# Patient Record
Sex: MALE | Race: WHITE | NOT HISPANIC OR LATINO | URBAN - METROPOLITAN AREA
[De-identification: names, ages, dates, MRNs, and addresses within clinical notes are randomized per-mention and may not be internally consistent; named-entity substitution may affect disease eponyms.]

---

## 2017-04-20 ENCOUNTER — IMPORTED ENCOUNTER (OUTPATIENT)
Dept: URBAN - METROPOLITAN AREA CLINIC 31 | Facility: CLINIC | Age: 72
End: 2017-04-20

## 2017-04-20 PROBLEM — Z96.1: Noted: 2017-04-20

## 2017-04-20 PROCEDURE — 92004 COMPRE OPH EXAM NEW PT 1/>: CPT

## 2017-04-20 PROCEDURE — 92015 DETERMINE REFRACTIVE STATE: CPT

## 2017-04-20 NOTE — PATIENT DISCUSSION
1.  Pseudophakia OU - IOLs stable. Clear capsules ou. Monitor. 2. Pt had questions about blue light. On computers alot. 3.  No rx changes. Good about wearing Gabby suns.  4.  RTN 1 yr CE   VSP

## 2018-05-14 ENCOUNTER — IMPORTED ENCOUNTER (OUTPATIENT)
Dept: URBAN - METROPOLITAN AREA CLINIC 31 | Facility: CLINIC | Age: 73
End: 2018-05-14

## 2018-05-14 PROBLEM — Z96.1: Noted: 2018-05-14

## 2018-05-14 PROCEDURE — 92015 DETERMINE REFRACTIVE STATE: CPT

## 2018-05-14 PROCEDURE — 92014 COMPRE OPH EXAM EST PT 1/>: CPT

## 2018-05-14 NOTE — PATIENT DISCUSSION
1.  Pseudophakia OU - IOLs stable. Clear capsules ou. Monitor. 2. Pt had questions about blue light. On computers alot. 3.  No rx changes. Good about wearing Gabby suns. 4.  RTN 1 yr CE     Has VSP for materials.   Gave copy

## 2019-04-23 ENCOUNTER — IMPORTED ENCOUNTER (OUTPATIENT)
Dept: URBAN - METROPOLITAN AREA CLINIC 31 | Facility: CLINIC | Age: 74
End: 2019-04-23

## 2019-04-23 PROBLEM — Z96.1: Noted: 2019-04-23

## 2019-04-23 PROCEDURE — 92014 COMPRE OPH EXAM EST PT 1/>: CPT

## 2019-04-23 PROCEDURE — 92015 DETERMINE REFRACTIVE STATE: CPT

## 2019-04-23 NOTE — PATIENT DISCUSSION
1.  Pseudophakia OU - IOLs stable. Clear capsules ou. Monitor. 2. Pt had questions about blue light. On computers alot. 3.  Slight rx changes. Good about wearing Gabby suns. 4.  RTN 1 yr CE     Has VSP for materials.   Gave copy

## 2019-09-29 ENCOUNTER — HEALTH MAINTENANCE LETTER (OUTPATIENT)
Age: 74
End: 2019-09-29

## 2020-03-15 ENCOUNTER — HEALTH MAINTENANCE LETTER (OUTPATIENT)
Age: 75
End: 2020-03-15

## 2020-11-04 NOTE — PATIENT DISCUSSION
KRYSTAL OU:  PRESCRIBED UV PROTECTION TO SLOW GROWTH. PRESCRIBE ARTIFICAL TEARS TO INCREASE COMFORT.

## 2020-11-11 ENCOUNTER — IMPORTED ENCOUNTER (OUTPATIENT)
Dept: URBAN - METROPOLITAN AREA CLINIC 31 | Facility: CLINIC | Age: 75
End: 2020-11-11

## 2020-11-11 PROBLEM — Z96.1: Noted: 2020-11-11

## 2020-11-11 PROCEDURE — 92015 DETERMINE REFRACTIVE STATE: CPT

## 2020-11-11 PROCEDURE — 92014 COMPRE OPH EXAM EST PT 1/>: CPT

## 2020-11-11 NOTE — PATIENT DISCUSSION
1.  Pseudophakia OU - IOLs stable. Clear capsules ou. Monitor. 2. Got blue leo gls. .  On computers alot. 3.  Slight rx changes. Good about wearing Gabby suns. wants another pr for tennis. 4.  RTN 1 yr CE     Has VSP for materials.   Gave copy

## 2021-01-14 ENCOUNTER — HEALTH MAINTENANCE LETTER (OUTPATIENT)
Age: 76
End: 2021-01-14

## 2021-05-08 ENCOUNTER — HEALTH MAINTENANCE LETTER (OUTPATIENT)
Age: 76
End: 2021-05-08

## 2021-10-23 ENCOUNTER — HEALTH MAINTENANCE LETTER (OUTPATIENT)
Age: 76
End: 2021-10-23

## 2021-11-08 ENCOUNTER — IMPORTED ENCOUNTER (OUTPATIENT)
Dept: URBAN - METROPOLITAN AREA CLINIC 31 | Facility: CLINIC | Age: 76
End: 2021-11-08

## 2021-11-08 PROBLEM — H01.004: Noted: 2021-11-08

## 2021-11-08 PROBLEM — H01.02A: Noted: 2021-11-08

## 2021-11-08 PROBLEM — H01.001: Noted: 2021-11-08

## 2021-11-08 PROBLEM — H01.005: Noted: 2021-11-08

## 2021-11-08 PROBLEM — H01.002: Noted: 2021-11-08

## 2021-11-08 PROBLEM — Z96.1: Noted: 2021-11-08

## 2021-11-08 PROBLEM — H01.02B: Noted: 2021-11-08

## 2021-11-08 PROCEDURE — 99214 OFFICE O/P EST MOD 30 MIN: CPT

## 2021-11-08 PROCEDURE — 92015 DETERMINE REFRACTIVE STATE: CPT

## 2021-11-08 NOTE — PATIENT DISCUSSION
1.  Blepharitis anterior type OU - The patient exhibits reddened crusty eyelid margins. Wash lids and Warm compresses were recommended. Artificial Tears to be used as needed for discomfort. 2. Pseudophakia OU - IOLs stable. Clear capsules ou. Monitor. 3. Got blue lighht gls. .  On computers alot. 4.  Slight rx changes. Good about wearing Gabby suns. wants another pr for tennis. 5.  RTN 1 yr CE     Has VSP for materials.   Gave copy

## 2022-04-02 ASSESSMENT — TONOMETRY
OD_IOP_MMHG: 14
OD_IOP_MMHG: 14
OD_IOP_MMHG: 15
OS_IOP_MMHG: 15
OS_IOP_MMHG: 14
OD_IOP_MMHG: 15
OS_IOP_MMHG: 15
OS_IOP_MMHG: 14
OS_IOP_MMHG: 15
OD_IOP_MMHG: 14

## 2022-04-02 ASSESSMENT — VISUAL ACUITY
OS_CC: 20/25
OD_CC: 20/25
OS_SC: 20/100
OD_SC: 20/100

## 2022-06-04 ENCOUNTER — HEALTH MAINTENANCE LETTER (OUTPATIENT)
Age: 77
End: 2022-06-04

## 2022-09-06 ENCOUNTER — VIRTUAL VISIT (OUTPATIENT)
Dept: FAMILY MEDICINE | Facility: CLINIC | Age: 77
End: 2022-09-06
Payer: MEDICARE

## 2022-09-06 DIAGNOSIS — U07.1 INFECTION DUE TO 2019 NOVEL CORONAVIRUS: Primary | ICD-10-CM

## 2022-09-06 PROCEDURE — 99442 PR PHYSICIAN TELEPHONE EVALUATION 11-20 MIN: CPT | Mod: CS | Performed by: FAMILY MEDICINE

## 2022-09-06 NOTE — PROGRESS NOTES
Alhaji is a 76 year old who is being evaluated via a billable telephone visit.      What phone number would you like to be contacted at? [phone]  How would you like to obtain your AVS? MyChart    Infection due to 2019 novel coronavirus  COVID-19 infection noted.  Onset of symptoms September 3, 2022 with positive diagnosis Sunday, September 4, 2022 with home testing.  Wife had been sick beginning last Tuesday, August 30 with positive test September 2, 2022 herself.  Did review patient's past history.  Did have slightly reduced renal function in 2014.  Lab assessment reviewed from [place] primary care provider without obvious renal function update.  Patient elects reduced Paxlovid dosing based on prior renal function results with GFR 53 and declines lab only visit for kidney function completion.  CDC recommendations regarding quarantine and symptomatic management reviewed.  - nirmatrelvir and ritonavir (PAXLOVID) therapy pack  Dispense: 30 each; Refill: 0       Subjective   Alhaji is a 76 year old, presenting for the following health issues:  tele - [phone] and Covid Concern (Positive home test - 9/5/22, symptoms started 9/4/22 - chest congestion, coughing, runny nose, general achy, lack of energy - pt has been vaccinated - oxygen levels 93-96%)      HPI     Virtual visit completed.  States that onset of symptoms of COVID September 3, 2022 with diagnosis September 4, 2022 with home test.  Wife was sick last week and tested positive on Friday, September 2, 2022 herself.  Patient had sore chest with cough.  Some head congestion.  Sore throat for about a day and this is now better.  Feels marginally better this morning.  Had been somewhat feverish and chilled previously in the evening however this is also resolved at this time.  Denies history of asthma.  Comprehensive review of systems as above otherwise all negative.      Review of Systems   Constitutional, HEENT, cardiovascular, pulmonary, GI, ,  musculoskeletal, neuro, skin, endocrine and psych systems are negative, except as otherwise noted.      Objective    Vitals - Patient Reported  Systolic (Patient Reported): 123  Diastolic (Patient Reported): 77  Pulse (Patient Reported): 77      Vitals:  No vitals were obtained today due to virtual visit.    Physical Exam   healthy, alert and no distress  PSYCH: Alert and oriented times 3; coherent speech, normal   rate and volume, able to articulate logical thoughts, able   to abstract reason, no tangential thoughts, no hallucinations   or delusions  His affect is normal  RESP: No cough, no audible wheezing, able to talk in full sentences  Remainder of exam unable to be completed due to telephone visits                Phone call duration: 15 minutes    [email or web address]  @[email or web address]@

## 2022-10-10 ENCOUNTER — HEALTH MAINTENANCE LETTER (OUTPATIENT)
Age: 77
End: 2022-10-10

## 2022-11-07 ENCOUNTER — COMPREHENSIVE EXAM (OUTPATIENT)
Dept: URBAN - METROPOLITAN AREA CLINIC 34 | Facility: CLINIC | Age: 77
End: 2022-11-07

## 2022-11-07 DIAGNOSIS — H01.02A: ICD-10-CM

## 2022-11-07 DIAGNOSIS — H01.02B: ICD-10-CM

## 2022-11-07 DIAGNOSIS — Z96.1: ICD-10-CM

## 2022-11-07 PROCEDURE — 92014 COMPRE OPH EXAM EST PT 1/>: CPT

## 2022-11-07 PROCEDURE — 92015 DETERMINE REFRACTIVE STATE: CPT

## 2022-11-07 ASSESSMENT — VISUAL ACUITY
OD_CC: 20/20
OD_CC: 20/25
OS_CC: 20/20-3
OS_CC: 20/20

## 2022-11-07 ASSESSMENT — TONOMETRY
OS_IOP_MMHG: 14
OD_IOP_MMHG: 15

## 2022-11-07 NOTE — PATIENT DISCUSSION
Got blue light gls. .  On computers alot. Slight rx changes. Good about wearing Gabyb suns. wants another pr for tennis.

## 2022-11-07 NOTE — PATIENT DISCUSSION
Blepharitis anterior type OU - The patient exhibits reddened crusty eyelid margins. Wash lids and Warm compresses were recommended. Artificial Tears to be used as needed for discomfort.

## 2023-03-20 ENCOUNTER — TELEPHONE (OUTPATIENT)
Dept: FAMILY MEDICINE | Facility: CLINIC | Age: 78
End: 2023-03-20
Payer: MEDICARE

## 2023-06-11 ENCOUNTER — HEALTH MAINTENANCE LETTER (OUTPATIENT)
Age: 78
End: 2023-06-11

## 2024-04-19 ENCOUNTER — COMPREHENSIVE EXAM (OUTPATIENT)
Dept: URBAN - METROPOLITAN AREA CLINIC 34 | Facility: CLINIC | Age: 79
End: 2024-04-19

## 2024-04-19 DIAGNOSIS — Z96.1: ICD-10-CM

## 2024-04-19 PROCEDURE — 92014 COMPRE OPH EXAM EST PT 1/>: CPT

## 2024-04-19 PROCEDURE — 92015 DETERMINE REFRACTIVE STATE: CPT

## 2024-04-19 ASSESSMENT — VISUAL ACUITY
OS_CC: 20/20-2
OD_CC: 20/20
OS_SC: 20/20-1
OD_SC: 20/20-1
OS_CC: 20/20
OD_CC: 20/20-1

## 2024-04-19 ASSESSMENT — TONOMETRY
OD_IOP_MMHG: 15
OS_IOP_MMHG: 15

## 2024-05-10 ENCOUNTER — ESTABLISHED PATIENT (OUTPATIENT)
Dept: URBAN - METROPOLITAN AREA CLINIC 34 | Facility: CLINIC | Age: 79
End: 2024-05-10

## 2024-05-10 DIAGNOSIS — Z96.1: ICD-10-CM

## 2024-05-10 PROCEDURE — 92015GRNC REFRACTION GLASSES RECHECK NO CHARGE

## 2024-05-10 ASSESSMENT — VISUAL ACUITY
OD_CC: 20/25
OU_CC: J1+
OS_CC: 20/25

## 2024-12-21 ENCOUNTER — HEALTH MAINTENANCE LETTER (OUTPATIENT)
Age: 79
End: 2024-12-21